# Patient Record
Sex: MALE | Race: OTHER | HISPANIC OR LATINO
[De-identification: names, ages, dates, MRNs, and addresses within clinical notes are randomized per-mention and may not be internally consistent; named-entity substitution may affect disease eponyms.]

---

## 2023-03-17 PROBLEM — Z00.00 ENCOUNTER FOR PREVENTIVE HEALTH EXAMINATION: Status: ACTIVE | Noted: 2023-03-17

## 2023-03-20 ENCOUNTER — NON-APPOINTMENT (OUTPATIENT)
Age: 43
End: 2023-03-20

## 2023-03-20 ENCOUNTER — APPOINTMENT (OUTPATIENT)
Dept: ORTHOPEDIC SURGERY | Facility: CLINIC | Age: 43
End: 2023-03-20
Payer: MEDICAID

## 2023-03-20 VITALS — BODY MASS INDEX: 31.5 KG/M2 | HEIGHT: 70 IN | WEIGHT: 220 LBS

## 2023-03-20 PROCEDURE — 99203 OFFICE O/P NEW LOW 30 MIN: CPT

## 2023-03-20 NOTE — HISTORY OF PRESENT ILLNESS
[de-identified] : location: right pectoral\par duration: 3/15/23\par context: constant pain; injury during incline press at gym 225 lbs\par quality: sharp pain \par aggravating factors: arm abduction\par associated sx: bruising \par conservative tx: naproxen \par prior studies: N/A

## 2023-03-20 NOTE — PHYSICAL EXAM
[de-identified] : Right Shoulder:\par \par Constitutional:\par The patient is healthy-appearing and in no apparent distress. \par \par Cardiovascular System: \par The capillary refill is less than 2 seconds. \par \par Skin: \par There is anterior lateral ecchymosis overlying the pectoralis.\par \par C-Spine/Neck:\par \par Active Range of Motion:\par Flexion				50\par Extension			60\par Lateral rotation			80  \par \par Right Shoulder: \par Inspection: \par There is no atrophy, erythema, warmth.\par There is moderate swelling.\par There is no scapular winging.\par There is no AC prominence. \par \par Bony Palpation: \par There is no tenderness of the clavicle.\par There is no tenderness of the acromioclavicular joint.\par There is no tenderness of the greater tuberosity. \par There is no tenderness of the bicipital groove.\par  \par Soft Tissue Palpation: \par There is no tenderness of the trapezius.\par There is no tenderness of the rhomboid.\par There is no tenderness of the subacromial bursa. \par There is tenderness of the pectoralis tendon.\par \par Active Range of Motion: \par Forward flexion- 				100 \par Abduction-					80\par External rotation at 0 degrees abduction-	80 \par Internal rotation at 0 degrees abduction-	80\par \par Passive Range of Motion: \par Forward flexion- 			120 \par Abduction-				80\par External rotation at 0 deg abduction-	80 \par Internal rotation at 0 deg abduction-	80\par \par Stability: \par There is no general laxity. \par There is no anterior apprehension.\par \par Strength: \par Supraspinatus abduction 		5/5\par External rotation at 0 deg abduction 	5/5\par Internal rotation at 0 deg abduction	5/5\par Scapular elevation 			5/5 \par \par Neurological System: \par \par There is normal sensation to light touch C5-T1. \par \par Stability: \par There is no general laxity. \par \par Psychiatric: \par The patient demonstrates a normal mood and affect and is active and alert.\par

## 2023-03-20 NOTE — ASSESSMENT
[FreeTextEntry1] : Discussed at length with patient regarding symptoms and diagnosis.  Treatment options discussed and patient's questions answered and patient expresses understanding.  Recommendation for MRI evaluation given concern for pectoralis rupture.

## 2023-04-07 RX ORDER — DIAZEPAM 2 MG/1
2 TABLET ORAL
Qty: 1 | Refills: 0 | Status: ACTIVE | COMMUNITY
Start: 2023-04-07 | End: 1900-01-01

## 2023-04-27 ENCOUNTER — APPOINTMENT (OUTPATIENT)
Dept: ORTHOPEDIC SURGERY | Facility: CLINIC | Age: 43
End: 2023-04-27
Payer: MEDICAID

## 2023-04-27 VITALS — BODY MASS INDEX: 32.56 KG/M2 | WEIGHT: 230 LBS | HEIGHT: 70.5 IN

## 2023-04-27 PROCEDURE — 99213 OFFICE O/P EST LOW 20 MIN: CPT

## 2023-05-03 NOTE — DISCUSSION/SUMMARY
[de-identified] : Patient has previously tried rest, activity modification, exercises, and medications (ie. anti-inflammatories, such as Ibuprofen or Tylenol) without improvement.  Patient has previously had x-ray evaluation.  Given persistent symptoms, a formal request is made for an MRI.

## 2023-05-03 NOTE — PHYSICAL EXAM
[de-identified] : Right Shoulder:\par \par Constitutional:\par The patient is healthy-appearing and in no apparent distress. \par \par Cardiovascular System: \par The capillary refill is less than 2 seconds. \par \par Skin: \par There is anterior lateral ecchymosis overlying the pectoralis.\par \par C-Spine/Neck:\par \par Active Range of Motion:\par Flexion				50\par Extension			60\par Lateral rotation			80  \par \par Right Shoulder: \par Inspection: \par There is no atrophy, erythema, warmth.\par There is moderate swelling with pectoralis muscle medial retraction\par There is no scapular winging.\par There is no AC prominence. \par \par Bony Palpation: \par There is no tenderness of the clavicle.\par There is no tenderness of the acromioclavicular joint.\par There is no tenderness of the greater tuberosity. \par There is no tenderness of the bicipital groove.\par  \par Soft Tissue Palpation: \par There is no tenderness of the trapezius.\par There is no tenderness of the rhomboid.\par There is no tenderness of the subacromial bursa. \par There is tenderness of the pectoralis tendon.\par \par Active Range of Motion: \par Forward flexion- 				100 \par Abduction-					80\par External rotation at 0 degrees abduction-	80 \par Internal rotation at 0 degrees abduction-	80\par \par Passive Range of Motion: \par Forward flexion- 			120 \par Abduction-				80\par External rotation at 0 deg abduction-	80 \par Internal rotation at 0 deg abduction-	80\par \par Stability: \par There is no general laxity. \par There is no anterior apprehension.\par \par Strength: \par Supraspinatus abduction 		5/5\par External rotation at 0 deg abduction 	5/5\par Internal rotation at 0 deg abduction	5/5\par Scapular elevation 			5/5 \par \par Neurological System: \par \par There is normal sensation to light touch C5-T1. \par \par Stability: \par There is no general laxity. \par \par Psychiatric: \par The patient demonstrates a normal mood and affect and is active and alert.\par  [de-identified] : MRI chest ([facility]): There is a 2 of the pectoralis muscle insertion onto the tendon with medial retraction

## 2023-05-03 NOTE — HISTORY OF PRESENT ILLNESS
[de-identified] : Follow up for Chest MRI Review\par Last Visit: 03/20/2023 - Initial Appointment - Chest \par Requested MRI without Contrast: Completed at [facility] on 04/10/2023

## 2023-05-03 NOTE — ASSESSMENT
[FreeTextEntry1] :  Discussed at length with patient exam history and imaging as well as treatment options as well as risks and benefits and patient at this time would like to consider his options but is leaning towards surgical fixation I discussed with the patient that given the muscle avulsion of the tendon that there may always be some mild weakness but intraoperative evaluation will determine whether repair of the tendon augmentation would be required postop restrictions for 6 weeks immobilization was discussed and he expresses understanding\par \par \par \par

## 2023-05-19 ENCOUNTER — APPOINTMENT (OUTPATIENT)
Dept: ORTHOPEDIC SURGERY | Facility: HOSPITAL | Age: 43
End: 2023-05-19
Payer: MEDICAID

## 2023-05-19 PROCEDURE — 23395 MUSCLE TRANSFER SHOULDER/ARM: CPT | Mod: RT

## 2023-05-19 RX ORDER — OXYCODONE AND ACETAMINOPHEN 5; 325 MG/1; MG/1
5-325 TABLET ORAL
Qty: 40 | Refills: 0 | Status: ACTIVE | COMMUNITY
Start: 2023-05-19 | End: 1900-01-01

## 2023-05-23 ENCOUNTER — APPOINTMENT (OUTPATIENT)
Dept: ORTHOPEDIC SURGERY | Facility: CLINIC | Age: 43
End: 2023-05-23
Payer: MEDICAID

## 2023-05-23 PROCEDURE — 99024 POSTOP FOLLOW-UP VISIT: CPT

## 2023-05-23 NOTE — HISTORY OF PRESENT ILLNESS
[de-identified] : s/p RIGHT pectoralis tendon repair [de-identified] : Patient is 4 days postop and states overall he is doing rather well not requiring any pain medication.  Denies any paresthesias and states he has been wearing the sling at all times.  Present with his girlfriend for history and exam [de-identified] : On exam of the right shoulder, dressing is removed and wound is clean dry and intact with staples.  Swelling and ecchymosis consistent with expectations.  There is full elbow range of motion and normal sensation light touch.  There is adequate positioning visually of the pectoralis muscle and tendon directly to palpation [de-identified] : s/p RIGHT pectoralis tendon repair [de-identified] : Discussed at length with patient surgery intraoperative valuation and need for allograft reconstruction and postop restrictions and he is to begin range of motion exercises at the elbow only maintain the sling for total of 6 weeks.  Follow-up in a week and a half for staple removal

## 2023-05-31 ENCOUNTER — APPOINTMENT (OUTPATIENT)
Dept: ORTHOPEDIC SURGERY | Facility: CLINIC | Age: 43
End: 2023-05-31
Payer: MEDICAID

## 2023-05-31 PROCEDURE — 99024 POSTOP FOLLOW-UP VISIT: CPT

## 2023-06-01 NOTE — HISTORY OF PRESENT ILLNESS
[de-identified] : s/p RIGHT pectoralis tendon repair [de-identified] : Patient is 12 days postop and states overall he is doing rather well not requiring any pain medication.  Denies any paresthesias and states he has been wearing the sling at all times.  Present with his girlfriend for history and exam [de-identified] : On exam of the right shoulder, dressing is removed and wound is clean dry and intact with staples- staples removed.  Swelling and ecchymosis consistent with expectations.  There is full elbow range of motion and normal sensation light touch.  There is adequate positioning visually of the pectoralis muscle and tendon directly to palpation [de-identified] : s/p RIGHT pectoralis tendon repair [de-identified] : Discussed at length with patient surgery intraoperative valuation and need for allograft reconstruction and postop restrictions and he is to begin range of motion exercises at the elbow only maintain the sling for total of 6 weeks.  Follow-up in a 3 weeks

## 2023-06-20 ENCOUNTER — APPOINTMENT (OUTPATIENT)
Dept: ORTHOPEDIC SURGERY | Facility: CLINIC | Age: 43
End: 2023-06-20
Payer: MEDICAID

## 2023-06-20 PROCEDURE — 99024 POSTOP FOLLOW-UP VISIT: CPT

## 2023-06-20 NOTE — HISTORY OF PRESENT ILLNESS
[de-identified] : s/p RIGHT pectoralis tendon repair [de-identified] : Patient is 4.5 weeks postop and states overall he is doing rather well not requiring any pain medication.  Denies any paresthesias and states he has been wearing the sling "most of the time" except he has been removing to sleep [de-identified] : On exam of the right shoulder,wound is healed. Swelling improved.  There is full elbow range of motion and normal sensation light touch.  There is adequate positioning visually of the pectoralis muscle and tendon directly to palpation [de-identified] : s/p RIGHT pectoralis tendon repair [de-identified] : Discussed at length with patient surgery intraoperative valuation and need for allograft reconstruction and postop restrictions and he is to begin range of motion exercises at the elbow only maintain the sling for total of 6 weeks.  Follow-up in a 3 weeks but may DC sling in 1.5 weeks.

## 2023-07-05 ENCOUNTER — APPOINTMENT (OUTPATIENT)
Dept: ORTHOPEDIC SURGERY | Facility: CLINIC | Age: 43
End: 2023-07-05
Payer: MEDICAID

## 2023-07-05 PROCEDURE — 99024 POSTOP FOLLOW-UP VISIT: CPT

## 2023-07-05 NOTE — ASSESSMENT
[FreeTextEntry1] : Blue at length with patient overall clinical improvement at this time to discontinue the sling and begin physical therapy with follow-up in 1 month activity restrictions were discussed in regards to particular any heavy lifting.  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

## 2023-07-05 NOTE — PHYSICAL EXAM
[de-identified] : On exam of the right shoulder: Wounds are healed and nontender with pectoralis tendon palpably intact.  Over the pec bilateral squeeze the tendon is noted to be intact with no abnormal motion.  Shoulder range of motion is active forward flexion to 80 degrees passive to 130.  Internal rotation 90 and 90 active and passive external rotation actively to 20 degrees passively to 30 degrees.  There is 5 out of 5 internal and external rotation strength of the shoulder

## 2023-07-05 NOTE — HISTORY OF PRESENT ILLNESS
[de-identified] : Patient is now 6 and half weeks postop and states overall he is doing rather well and has been maintaining the sling.  He is not taking any pain medication and denies any paresthesias

## 2023-08-07 ENCOUNTER — APPOINTMENT (OUTPATIENT)
Dept: ORTHOPEDIC SURGERY | Facility: CLINIC | Age: 43
End: 2023-08-07
Payer: MEDICAID

## 2023-08-07 PROCEDURE — 99024 POSTOP FOLLOW-UP VISIT: CPT

## 2023-08-07 NOTE — PHYSICAL EXAM
[de-identified] : On exam of the right shoulder: Wounds are healed and nontender with pectoralis tendon palpably intact.  Over the pec bilateral squeeze the tendon is noted to be intact with no abnormal motion.  Shoulder range of motion is active forward flexion to 160 degrees passive to 170.  Internal rotation 90 and 9 active and passive external rotation actively to 80 degrees passively to 80 degrees.  There is 5 out of 5 internal and external rotation strength of the shoulder

## 2023-08-07 NOTE — ASSESSMENT
[FreeTextEntry1] : Discussed at length with patient overall clinical improvement and recommendation to continue physical therapy and home exercises with avoidance of any heavy lifting for the next 6 weeks.  Patient is to follow-up in 6 weeks if any concern as needed

## 2023-08-07 NOTE — HISTORY OF PRESENT ILLNESS
[de-identified] : Patient is 11 weeks postop and states overall he is doing rather well and has been going to PT  He is not taking any pain medication and denies any paresthesias

## 2023-09-19 ENCOUNTER — APPOINTMENT (OUTPATIENT)
Dept: ORTHOPEDIC SURGERY | Facility: CLINIC | Age: 43
End: 2023-09-19
Payer: MEDICAID

## 2023-09-19 DIAGNOSIS — S29.011A STRAIN OF MUSCLE AND TENDON OF FRONT WALL OF THORAX, INITIAL ENCOUNTER: ICD-10-CM

## 2023-09-19 PROCEDURE — 99213 OFFICE O/P EST LOW 20 MIN: CPT
